# Patient Record
Sex: FEMALE | Race: WHITE | NOT HISPANIC OR LATINO | URBAN - METROPOLITAN AREA
[De-identification: names, ages, dates, MRNs, and addresses within clinical notes are randomized per-mention and may not be internally consistent; named-entity substitution may affect disease eponyms.]

---

## 2017-10-06 ENCOUNTER — IMPORTED ENCOUNTER (OUTPATIENT)
Dept: URBAN - METROPOLITAN AREA CLINIC 50 | Facility: CLINIC | Age: 67
End: 2017-10-06

## 2020-12-02 ENCOUNTER — IMPORTED ENCOUNTER (OUTPATIENT)
Dept: URBAN - METROPOLITAN AREA CLINIC 50 | Facility: CLINIC | Age: 70
End: 2020-12-02

## 2020-12-21 ENCOUNTER — IMPORTED ENCOUNTER (OUTPATIENT)
Dept: URBAN - METROPOLITAN AREA CLINIC 50 | Facility: CLINIC | Age: 70
End: 2020-12-21

## 2021-01-06 ENCOUNTER — IMPORTED ENCOUNTER (OUTPATIENT)
Dept: URBAN - METROPOLITAN AREA CLINIC 50 | Facility: CLINIC | Age: 71
End: 2021-01-06

## 2021-04-17 ASSESSMENT — VISUAL ACUITY
OD_CC: 20/25
OS_BAT: 20/25
OS_CC: 20/25
OD_BAT: 20/25
OS_CC: 20/30-+
OS_CC: J1+
OD_CC: 20/30+3
OD_CC: J1+
OD_OTHER: 20/25. 20/30.
OS_OTHER: 20/25. 20/30.

## 2021-04-17 ASSESSMENT — PACHYMETRY
OS_CT_UM: 532
OD_CT_UM: 523

## 2021-04-17 ASSESSMENT — TONOMETRY
OD_IOP_MMHG: 16
OS_IOP_MMHG: 17
OD_IOP_MMHG: 16
OS_IOP_MMHG: 18

## 2023-05-22 ENCOUNTER — PREPPED CHART (OUTPATIENT)
Dept: URBAN - METROPOLITAN AREA CLINIC 49 | Facility: CLINIC | Age: 73
End: 2023-05-22

## 2023-05-23 ENCOUNTER — ESTABLISHED PATIENT (OUTPATIENT)
Dept: URBAN - METROPOLITAN AREA CLINIC 49 | Facility: CLINIC | Age: 73
End: 2023-05-23

## 2023-05-23 DIAGNOSIS — H04.123: ICD-10-CM

## 2023-05-23 DIAGNOSIS — H10.12: ICD-10-CM

## 2023-05-23 PROCEDURE — 92012 INTRM OPH EXAM EST PATIENT: CPT

## 2023-05-23 RX ORDER — TOBRAMYCIN AND DEXAMETHASONE 1; 3 MG/ML; MG/ML
1 SUSPENSION/ DROPS OPHTHALMIC
Start: 2023-05-23 | End: 2023-05-30

## 2023-05-23 ASSESSMENT — TONOMETRY
OD_IOP_MMHG: 17
OS_IOP_MMHG: 17
OD_IOP_MMHG: 18
OS_IOP_MMHG: 16

## 2023-05-23 ASSESSMENT — VISUAL ACUITY
OU_CC: 20/25
OD_CC: 20/25
OS_CC: 20/25

## 2023-06-22 ENCOUNTER — ESTABLISHED PATIENT (OUTPATIENT)
Dept: URBAN - METROPOLITAN AREA CLINIC 49 | Facility: CLINIC | Age: 73
End: 2023-06-22

## 2023-06-22 DIAGNOSIS — H01.006: ICD-10-CM

## 2023-06-22 DIAGNOSIS — H01.003: ICD-10-CM

## 2023-06-22 DIAGNOSIS — H10.12: ICD-10-CM

## 2023-06-22 PROCEDURE — 92012 INTRM OPH EXAM EST PATIENT: CPT

## 2023-06-22 ASSESSMENT — TONOMETRY
OS_IOP_MMHG: 20
OS_IOP_MMHG: 19
OD_IOP_MMHG: 19
OD_IOP_MMHG: 20

## 2023-06-22 ASSESSMENT — VISUAL ACUITY
OS_CC: 20/30+2
OD_CC: 20/40+2
OD_PH: 20/30
OS_PH: 20/25